# Patient Record
Sex: FEMALE | Race: WHITE
[De-identification: names, ages, dates, MRNs, and addresses within clinical notes are randomized per-mention and may not be internally consistent; named-entity substitution may affect disease eponyms.]

---

## 2020-03-07 ENCOUNTER — HOSPITAL ENCOUNTER (EMERGENCY)
Dept: HOSPITAL 76 - ED | Age: 30
Discharge: HOME | End: 2020-03-07
Payer: MEDICAID

## 2020-03-07 VITALS — DIASTOLIC BLOOD PRESSURE: 74 MMHG | SYSTOLIC BLOOD PRESSURE: 123 MMHG

## 2020-03-07 DIAGNOSIS — F41.9: ICD-10-CM

## 2020-03-07 DIAGNOSIS — Z76.0: Primary | ICD-10-CM

## 2020-03-07 PROCEDURE — 99284 EMERGENCY DEPT VISIT MOD MDM: CPT

## 2020-03-07 PROCEDURE — 99281 EMR DPT VST MAYX REQ PHY/QHP: CPT

## 2020-03-07 NOTE — ED PHYSICIAN DOCUMENTATION
History of Present Illness





- Stated complaint


Stated Complaint: MED REFILL





- Chief complaint


Chief Complaint: General





- History obtained from


History obtained from: Patient (29-year-old woman recently moved to the area and

is here requesting refills of her psychiatric medications.  She has been out of 

most of them for about a week and is not feeling psychotic or suicidal.  She is 

just starting to feel more anxious and would like some help.  She is tried to 

get a local prescriber but most of the offices are scheduling about a month 

out.)





Review of Systems


Ten Systems: 10 systems reviewed and negative





PD PAST MEDICAL HISTORY





- Present Medications


Home Medications: 


                                Ambulatory Orders











 Medication  Instructions  Recorded  Confirmed


 


ARIPiprazole [Aripiprazole] 10 mg PO DAILY 03/07/20 03/07/20


 


ARIPiprazole [Aripiprazole] 10 mg PO DAILY #30 tablet 03/07/20 


 


Gabapentin 400 mg PO TID 03/07/20 03/07/20


 


Venlafaxine ER [Effexor ER] 75 mg PO DAILY #30 capsule 03/07/20 


 


Venlafaxine HCl [Venlafaxine HCl 150 mg PO DAILY 03/07/20 03/07/20





ER]   


 


Venlafaxine HCl [Venlafaxine HCl 150 mg PO DAILY #30 tab.er.24 03/07/20 





ER]   


 


buPROPion HCL [Bupropion HCl] 150 mg PO BID 03/07/20 03/07/20


 


buPROPion [Wellbutrin Sr] 150 mg PO BID #60 tablet 03/07/20 


 


clonazePAM [Clonazepam] 1 mg PO BID 03/07/20 03/07/20


 


clonazePAM [Clonazepam] 1 mg PO BID PRN #15 tablet 03/07/20 














- Allergies


Allergies/Adverse Reactions: 


                                    Allergies











Allergy/AdvReac Type Severity Reaction Status Date / Time


 


No Known Drug Allergies Allergy   Verified 03/07/20 17:10














PD ED PE NORMAL





- Vitals


Vital signs reviewed: Yes





- General


General: Alert and oriented X 3, No acute distress





- HEENT


HEENT: PERRL





- Neuro


Neuro: Alert and oriented X 3, No motor deficit, No sensory deficit, Normal 

speech





- Psych


Psych: Normal mood, Normal affect





Results





- Vitals


Vitals: 





                               Vital Signs - 24 hr











  03/07/20





  17:06


 


Temperature 36.6 C


 


Heart Rate 98


 


Respiratory 16





Rate 


 


Blood Pressure 123/74


 


O2 Saturation 100








                                     Oxygen











O2 Source                      Room air

















PD MEDICAL DECISION MAKING





- ED course


ED course: 





Her meds were refilled, but only a few of the clonazepam with a taper, also she 

did not ask me specifically for gabapentin although it was noted on her med list

 from the nurses chart but per policy we do not refill gabapentin from this ER.





Departure





- Departure


Disposition: 01 Home, Self Care


Clinical Impression: 


 Psychiatric symptoms





Condition: Good


Record reviewed to determine appropriate education?: Yes


Instructions:  Anxiety Disorder


Prescriptions: 


ARIPiprazole [Aripiprazole] 10 mg PO DAILY #30 tablet


buPROPion [Wellbutrin Sr] 150 mg PO BID #60 tablet


clonazePAM [Clonazepam] 1 mg PO BID PRN #15 tablet


 PRN Reason: Anxiety


Venlafaxine ER [Effexor ER] 75 mg PO DAILY #30 capsule


Venlafaxine HCl [Venlafaxine HCl ER] 150 mg PO DAILY #30 tab.er.24


Comments: 


Return anytime for new or worsening symptoms.  It is important to follow-up with

a psychiatric prescriber locally.





Follow-up with [facility] at [phone] to schedule psychiatric 

care and counseling.

## 2020-05-30 ENCOUNTER — HOSPITAL ENCOUNTER (EMERGENCY)
Dept: HOSPITAL 76 - ED | Age: 30
Discharge: HOME | End: 2020-05-30
Payer: MEDICAID

## 2020-05-30 VITALS — DIASTOLIC BLOOD PRESSURE: 65 MMHG | SYSTOLIC BLOOD PRESSURE: 110 MMHG

## 2020-05-30 DIAGNOSIS — F17.200: ICD-10-CM

## 2020-05-30 DIAGNOSIS — R09.1: Primary | ICD-10-CM

## 2020-05-30 PROCEDURE — 99283 EMERGENCY DEPT VISIT LOW MDM: CPT

## 2020-05-30 PROCEDURE — 71101 X-RAY EXAM UNILAT RIBS/CHEST: CPT

## 2020-05-30 PROCEDURE — 99284 EMERGENCY DEPT VISIT MOD MDM: CPT

## 2020-05-30 NOTE — ED PHYSICIAN DOCUMENTATION
History of Present Illness





- Stated complaint


Stated Complaint: RIB PAIN





- Chief complaint


Chief Complaint: General





- History obtained from


History obtained from: Patient





- History of Present Illness


Timing: How many weeks ago (2)


Pain level max: 6


Pain level now: 5





- Additonal information


Additional information: 


L rib pain x 2 weeks. Had a cough, better now.  No fever. Worse with movement 

and better with rest.  She is concerned that she may have a broken rib. No 

trauma. No recent surgery.  No recent travel.  No leg or calf swelling.





Review of Systems


Constitutional: denies: Fever, Chills


Nose: denies: Rhinorrhea / runny nose, Congestion


Cardiac: denies: Chest pain / pressure


Respiratory: reports: Cough


GI: denies: Vomiting, Diarrhea


Skin: denies: Rash


Musculoskeletal: denies: Neck pain, Back pain


Neurologic: denies: Headache, Head injury





PD PAST MEDICAL HISTORY





- Past Medical History


Past Medical History: Yes


Psych: Depression, Anxiety





- Past Surgical History


Past Surgical History: No





- Present Medications


Home Medications: 


                                Ambulatory Orders











 Medication  Instructions  Recorded  Confirmed


 


ARIPiprazole [Aripiprazole] 10 mg PO DAILY 03/07/20 03/07/20


 


ARIPiprazole [Aripiprazole] 10 mg PO DAILY #30 tablet 03/07/20 


 


Gabapentin 400 mg PO TID 03/07/20 03/07/20


 


Venlafaxine ER [Effexor ER] 75 mg PO DAILY #30 capsule 03/07/20 


 


Venlafaxine HCl [Venlafaxine HCl 150 mg PO DAILY 03/07/20 03/07/20





ER]   


 


Venlafaxine HCl [Venlafaxine HCl 150 mg PO DAILY #30 tab.er.24 03/07/20 





ER]   


 


buPROPion HCL [Bupropion HCl] 150 mg PO BID 03/07/20 03/07/20


 


buPROPion [Wellbutrin Sr] 150 mg PO BID #60 tablet 03/07/20 


 


clonazePAM [Clonazepam] 1 mg PO BID 03/07/20 03/07/20


 


clonazePAM [Clonazepam] 1 mg PO BID PRN #15 tablet 03/07/20 


 


Meloxicam [Mobic] 15 mg PO DAILY PRN #20 tablet 05/30/20 














- Allergies


Allergies/Adverse Reactions: 


                                    Allergies











Allergy/AdvReac Type Severity Reaction Status Date / Time


 


No Known Drug Allergies Allergy   Verified 05/30/20 16:00














- Living Situation


Living Situation: reports: With family


Living Arrangement: reports: At home





- Social History


Does the pt smoke?: Yes


Smoking Status: Current every day smoker





- Family History


Family history: reports: Non contributory





PD ED PE NORMAL





- Vitals


Vital signs reviewed: Yes





- General


General: Alert and oriented X 3, No acute distress, Well developed/nourished





- HEENT


HEENT: PERRL, Moist mucous membranes





- Neck


Neck: Supple, no meningeal sign





- Cardiac


Cardiac: RRR, Strong equal pulses





- Respiratory


Respiratory: No respiratory distress, Other (Diminished breath sounds 

bilaterally.  No wheezing.  No chest wall tenderness on either side.  She points

 to her side on the left ribs, there is no tenderness here)





- Abdomen


Abdomen: Normal bowel sounds, Soft, Non tender, Non distended





- Derm


Derm: Warm and dry





- Extremities


Extremities: No edema, No calf tenderness / cord





- Neuro


Neuro: Alert and oriented X 3





- Psych


Psych: Normal mood, Normal affect





Results





- Vitals


Vitals: 


                               Vital Signs - 24 hr











  05/30/20 05/30/20





  15:58 16:39


 


Temperature 36 C L 


 


Heart Rate 88 88


 


Respiratory 18 14





Rate  


 


Blood Pressure 111/64 


 


O2 Saturation 100 








                                     Oxygen











O2 Source                      Room air

















- Rads (name of study)


  ** Left ribs with x-ray


Radiology: Prelim report reviewed, EMP read contemporaneously, See rad report 

(Normal)





PD MEDICAL DECISION MAKING





- ED course


Complexity details: reviewed results, re-evaluated patient, considered 

differential, d/w patient


ED course: 





Patient with what appears to be pleurisy.  No evidence of pulmonary embolus.  No

 evidence of pneumothorax.  No rib fracture.  Feels better after meloxicam and 

Lidoderm patch.  Albuterol inhaler did not change her symptoms much.  No 

pneumonia.  Patient is well-appearing, nontoxic.  Afebrile.   Patient counseled 

regarding signs and symptoms for which I believe and urgent re-evaluation would 

be necessary. Patient with good understanding of and agreement to plan and is 

comfortable going home at this time





This document was made in part using voice recognition software. While efforts 

are made to proofread this document, sound alike and grammatical errors may 

occur.





Departure





- Departure


Disposition: 01 Home, Self Care


Clinical Impression: 


 Pleurisy





Condition: Good


Instructions:  ED Chest Pain Pleurisy


Follow-Up: 


Holiday,Kailey M, PA [Primary Care Provider] - Within 1 week


Prescriptions: 


Meloxicam [Mobic] 15 mg PO DAILY PRN #20 tablet


 PRN Reason: pain


Comments: 


Return if you worsen. your xray is normal today. This should improve over the 

next week or two.

## 2020-05-30 NOTE — XRAY REPORT
Reason:  L rib pain x 2 weeks

Procedure Date:  05/30/2020   

Accession Number:  [id number] / [id number]                    

Procedure:  XR  - Ribs w/PA Chest LT CPT Code:  

 

***Final Report***

 

 

FULL RESULT:

 

 

EXAM:

LEFT RIB RADIOGRAPHY

 

EXAM DATE: 5/30/2020 04:53 PM.

 

CLINICAL HISTORY: Left rib pain x 2 weeks.

 

COMPARISON: None.

 

TECHNIQUE: 1 view of the chest and 2 views of the ribs.

 

FINDINGS:

Bones: Normal. No fracture or bone lesion.

 

Lungs: No focal opacities. No pneumothorax. No pleural effusions.

 

Mediastinum: Heart and mediastinal contours are unremarkable.

 

Other: S-shaped scoliosis.

IMPRESSION: Scoliosis, otherwise unremarkable chest and rib radiography.

 

[facility]

## 2020-11-29 ENCOUNTER — HOSPITAL ENCOUNTER (EMERGENCY)
Dept: HOSPITAL 76 - ED | Age: 30
Discharge: HOME | End: 2020-11-29
Payer: MEDICAID

## 2020-11-29 VITALS — DIASTOLIC BLOOD PRESSURE: 70 MMHG | SYSTOLIC BLOOD PRESSURE: 124 MMHG

## 2020-11-29 DIAGNOSIS — F17.200: ICD-10-CM

## 2020-11-29 DIAGNOSIS — R09.1: Primary | ICD-10-CM

## 2020-11-29 PROCEDURE — 99283 EMERGENCY DEPT VISIT LOW MDM: CPT

## 2020-11-29 PROCEDURE — 71045 X-RAY EXAM CHEST 1 VIEW: CPT

## 2020-11-29 NOTE — XRAY REPORT
PROCEDURE:  Chest 1 View X-Ray

 

INDICATIONS:  cough

 

TECHNIQUE:  One view of the chest was acquired.  

 

COMPARISON:  CXR 5/30/2020.

 

FINDINGS:  

 

Surgical changes and devices:  None.  

 

Lungs and pleura:  No pleural effusions or pneumothorax.  Lungs are clear.  

 

Mediastinum:  Mediastinal contours appear normal.  Heart size is normal.  

 

Bones and chest wall:  No suspicious bony lesions. Mild scoliosis. Overlying soft tissues appear unre
markable.  

 

IMPRESSION:  

No acute cardiopulmonary abnormality.

 

Reviewed by: Sherif Banks MD on 11/29/2020 9:55 PM PST

Approved by: Sherif Banks MD on 11/29/2020 9:55 PM PST

 

 

Station ID:  [id number]

## 2020-11-29 NOTE — ED PHYSICIAN DOCUMENTATION
History of Present Illness





- Stated complaint


Stated Complaint: COUGH, CHEST "POP"





- Chief complaint


Chief Complaint: General





- History obtained from


History obtained from: Patient





- History of Present Illness


Timing: Enter  time (18:00), Today


Pain level now: 4


Improved by: rest


Worsened by: movement, deep inspiration





- Additonal information


Additional information: 





c/o sudden onset left-sided chest pain 6 pm tonight when coughing. She says she 

has had previous episodes of similar pain and was diagnosed with pleurisy. 

denies fever, denies feeling short of breath





Review of Systems


Constitutional: denies: Fever


Cardiac: reports: Chest pain / pressure.  denies: Palpitations, Pedal edema, 

Calf pain


Respiratory: reports: Cough.  denies: Dyspnea, Hemoptysis, Wheezing





PD PAST MEDICAL HISTORY





- Past Medical History


Past Medical History: Yes


Respiratory: Other


Psych: Depression, Anxiety


Other Past Medical History: pleurisy





- Past Surgical History


Past Surgical History: No


/GYN:  section


HEENT: Tonsil/Adenoidectomy





- Present Medications


Home Medications: 


                                Ambulatory Orders











 Medication  Instructions  Recorded  Confirmed


 


Venlafaxine HCl [Venlafaxine HCl 150 mg PO DAILY #30 tab.er.24 20 





ER]   


 


clonazePAM [Clonazepam] 1 mg PO BID PRN #15 tablet 20 


 


Lisdexamfetamine Dimesylate 70 mg PO DAILY 20





[Vyvanse]   


 


Propranolol [Inderal] 10 mg DAILY 20


 


buPROPion [Wellbutrin Sr] 150 mg PO DAILY 20 














- Allergies


Allergies/Adverse Reactions: 


                                    Allergies











Allergy/AdvReac Type Severity Reaction Status Date / Time


 


No Known Drug Allergies Allergy   Verified 20 20:38














- Social History


Does the pt smoke?: Yes


Smoking Status: Current every day smoker


Does the pt drink ETOH?: No


Does the pt have substance abuse?: No


Substance Use and Type: Marijuana





- Immunizations


Immunizations are current?: Yes





- POLST


Patient has POLST: No





PD ED PE NORMAL





- Vitals


Vital signs reviewed: Yes





- General


General: Alert and oriented X 3, No acute distress, Well developed/nourished





- Cardiac


Cardiac: RRR, No murmur, No gallop, No rub





- Respiratory


Respiratory: No respiratory distress, Clear bilaterally





Results





- Vitals


Vitals: 


                               Vital Signs - 24 hr











  20





  20:38 21:03 22:44


 


Temperature 36.8 C  36.8 C


 


Heart Rate 108 H 106 H 90


 


Respiratory 16 18 16





Rate   


 


Blood Pressure 140/90 H 114/76 124/70


 


O2 Saturation 100 99 100








                                     Oxygen











O2 Source                      Room air

















- Rads (name of study)


  ** chest xray


Radiology: Prelim report reviewed, See rad report





PD MEDICAL DECISION MAKING





- ED course


Complexity details: reviewed results, re-evaluated patient, considered 

differential, d/w patient





Departure





- Departure


Disposition: 01 Home, Self Care


Clinical Impression: 


 Pleurisy





Condition: Good


Instructions:  ED Chest Pain Pleurisy


Follow-Up: 


Kailey Del Valle PA [Primary Care Provider] - 


Forms:  Activity restrictions


Discharge Date/Time: 20 22:44

## 2021-02-14 ENCOUNTER — HOSPITAL ENCOUNTER (OUTPATIENT)
Dept: HOSPITAL 76 - DI | Age: 31
Discharge: HOME | End: 2021-02-14
Attending: OBSTETRICS & GYNECOLOGY
Payer: MEDICAID

## 2021-02-14 DIAGNOSIS — N92.6: Primary | ICD-10-CM

## 2021-02-14 NOTE — ULTRASOUND REPORT
PROCEDURE:  Pelvic w/Transvaginal

 

INDICATIONS:  IRREGULAR MENSES

 

TECHNIQUE:  

Real-time scanning was performed of the pelvic organs, with image documentation.  Additional endovagi
nal scanning was necessary due to incomplete visualization of the adnexal and endometrial structures 
by transabdominal scanning.  

 

COMPARISON:  None.

 

FINDINGS:  

No pathologic free abdominal or pelvic fluid.  

 

Uterus:  Uterus is normal in size at 9.6 x 4.4 x 5.2 cm. The uterus demonstrates a heterogeneous lobu
lated appearance, yet without focal fibroids.  The endometrium measures 10 mm in combined thickness. 
 Nabothian cysts are incidentally noted. A  scar can be seen.

 

Ovaries:  The right ovary measures 3 x 2.5 x 2 cm and the left ovary measures 3 x 2.3 x 2.8 cm. No si
gnificant ovarian abnormalities are seen.  There are more than 12 follicles seen involving each ovary
. No adnexal masses are seen.   

 

 

 

IMPRESSION:  

 

The uterine size is at the upper limits of normal, with a lobulated, heterogeneous appearance. Howeve
r, no focal fibroids can be seen.

 

More than 12 follicles can be seen involving each ovary. Please consider polycystic ovarian syndrome.


 

Reviewed by: Yossi Coleman MD on 2021 2:52 PM [facility]

Approved by: Yossi Coleman MD on 2021 2:52 PM [facility]

 

 

Station ID:  [id number]

## 2021-03-16 ENCOUNTER — HOSPITAL ENCOUNTER (OUTPATIENT)
Dept: HOSPITAL 76 - LAB.WCP | Age: 31
Discharge: HOME | End: 2021-03-16
Attending: PSYCHIATRY & NEUROLOGY
Payer: MEDICAID

## 2021-03-16 DIAGNOSIS — Z79.899: Primary | ICD-10-CM

## 2021-03-16 PROCEDURE — 81599 UNLISTED MAAA: CPT

## 2021-03-16 PROCEDURE — 80365 DRUG SCREENING OXYCODONE: CPT

## 2021-03-16 PROCEDURE — 80361 OPIATES 1 OR MORE: CPT

## 2021-04-17 ENCOUNTER — HOSPITAL ENCOUNTER (EMERGENCY)
Dept: HOSPITAL 76 - ED | Age: 31
Discharge: HOME | End: 2021-04-17
Payer: MEDICAID

## 2021-04-17 VITALS — DIASTOLIC BLOOD PRESSURE: 87 MMHG | SYSTOLIC BLOOD PRESSURE: 136 MMHG
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
## 2021-04-17 DIAGNOSIS — F17.200: ICD-10-CM

## 2021-04-17 DIAGNOSIS — M79.645: ICD-10-CM

## 2021-04-17 DIAGNOSIS — S60.222A: ICD-10-CM

## 2021-04-17 DIAGNOSIS — M25.532: Primary | ICD-10-CM
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
## 2021-04-17 PROCEDURE — 99283 EMERGENCY DEPT VISIT LOW MDM: CPT

## 2021-04-17 PROCEDURE — 99282 EMERGENCY DEPT VISIT SF MDM: CPT

## 2021-04-17 NOTE — ED PHYSICIAN DOCUMENTATION
History of Present Illness





- Stated complaint


Stated Complaint: LEFT WRIST INJURY





- Chief complaint


Chief Complaint: Trauma Ext





- Additonal information


Additional information: 


30-year-old female presents the emergency department for evaluation of acute 

left wrist pain sustained this afternoon when she tripped over a dog gate she 

fell directly onto her wrist and knee.  No history of previous injury to this 

hand or wrist.  She is right-hand dominant.








Review of Systems


Constitutional: denies: Fever, Chills


Eyes: reports: Reviewed and negative


Ears: reports: Reviewed and negative


Nose: reports: Reviewed and negative


Throat: reports: Reviewed and negative


Cardiac: reports: Chest pain / pressure


Respiratory: reports: Reviewed and negative


GI: reports: Reviewed and negative


: reports: Reviewed and negative


Musculoskeletal: reports: Extremity pain (Left hand wrist)





PD PAST MEDICAL HISTORY





- Past Medical History


Respiratory: Other


Psych: Depression, Anxiety





- Past Surgical History


Past Surgical History: No


/GYN:  section


HEENT: Tonsil/Adenoidectomy





- Present Medications


Home Medications: 


                                Ambulatory Orders











 Medication  Instructions  Recorded  Confirmed


 


Venlafaxine HCl [Venlafaxine HCl 150 mg PO DAILY #30 tab.er.24 20





ER]   


 


clonazePAM [Clonazepam] 1 mg PO BID PRN #15 tablet 20


 


Propranolol [Inderal] 10 mg PO BID 20


 


Brexpiprazole [Rexulti] 1 mg PO DAILY 21


 


Dextroamphetamine/Amphetamine 40 mg PO DAILY 21





[Adderall 20 mg Tablet]   


 


Venlafaxine ER [Effexor ER] 75 mg PO DAILY 21














- Allergies


Allergies/Adverse Reactions: 


                                    Allergies











Allergy/AdvReac Type Severity Reaction Status Date / Time


 


No Known Drug Allergies Allergy   Verified 21 18:16














- Social History


Does the pt smoke?: Yes


Smoking Status: Current every day smoker


Does the pt drink ETOH?: No


Does the pt have substance abuse?: No





- Immunizations


Immunizations are current?: Yes





- POLST


Patient has POLST: No





PD ED PE EXPANDED





- General


General: Alert, In Pain





- Extremities


Extremities: Left wrist (Positive snuffbox tenderness left wrist.  Pain with 

passive flexion and extension of wrist.  Most of the pain is on the dorsum.  No 

deformity.  2+ radial and DP pulse.), Left hand (Significant swelling with mild 

ecchymosis left thenar eminence.Flexion and extension at MCP and DIP of thumb 

preserved.)





Results





- Vitals


Vitals: 


                               Vital Signs - 24 hr











  21





  18:19


 


Temperature 36.8 C


 


Heart Rate 100


 


Respiratory 19





Rate 


 


Blood Pressure 136/87 H


 


O2 Saturation 100








                                     Oxygen











O2 Source                      Room air

















- Rads (name of study)


  ** left wrist XR


Radiology: Final report received (No acute osseous abnormality.)





  ** left hand XR


Radiology: Final report received (No acute osseous abnormality.)





PD MEDICAL DECISION MAKING





- ED course


Complexity details: reviewed results, re-evaluated patient, d/w patient


ED course: 





30-year-old female presents here with acute left wrist pain after a fall at 

home.  She has fair amount of swelling on the thenar eminence of the left hand 

as well as scaphoid tenderness.  X-ray shows no acute abnormality.  She was 

placed in a thumb spica splint will be advised ibuprofen and follow-up with 

orthopedics in 7 to 10 days for repeat imaging.





Departure





- Departure


Disposition: 01 Home, Self Care


Clinical Impression: 


 Left wrist pain, Pain of left thumb





Condition: Stable


Record reviewed to determine appropriate education?: Yes


Instructions:  ED Contusion Upper Extr Ch


Follow-Up: 


[facility] [Provider Group]


Comments: 


Marcia you are seen in the emergency department today for pain in your left thumb

and wrist after fall at home.  The x-rays of both your hand and wrist do not 

show any obvious fractures.  However you do have tenderness in the snuffbox area

of your left wrist which can be a sign of an occult or difficult to see fracture

of the scaphoid bone.





Please continue to wear your wrist splint.  I recommend you take ibuprofen for 

discomfort over-the-counter.  Please call the orthopedics department to schedule

follow-up in 7 to 10 days.  If pain is improved and x-ray shows no signs of a 

fracture you may then likely be free of the splint.

## 2021-04-17 NOTE — XRAY REPORT
PROCEDURE:  Hand 3 View LT

 

INDICATIONS:  fall; pain in wrist and thumb

 

TECHNIQUE:  3 views of the hand(s) acquired.  

 

COMPARISON:  Same-day wrist radiographs.

 

FINDINGS:  

 

Bones: There is no fracture. Mild negative ulnar variance which may predispose to Keinboch's disease.
 Joint spacing is maintained.

 

Soft tissues:  No suspicious soft tissue calcifications.  

 

IMPRESSION:  

No acute osseous abnormality.

 

Reviewed by: Quang Whitehead DO on 4/17/2021 5:51 PM [facility]

Approved by: Quang Whitehead DO on 4/17/2021 5:51 PM [facility]

 

 

Station ID:  [id number]

## 2021-04-17 NOTE — XRAY REPORT
PROCEDURE:  Wrist 4 View LT

 

INDICATIONS: Trauma

 

TECHNIQUE:  4 views of the wrist were acquired.  

 

COMPARISON:  None

 

FINDINGS:  

 

Bones: No fracture or dislocation. Mild negative ulnar variance which may predispose to an box diseas
e. Alignment is otherwise normal. Joint spacing is maintained.

 

Scaphoid view:  No fracture.

 

Soft tissues:  No suspicious soft tissue calcifications.  

 

IMPRESSION:  

No acute osseous abnormality.

 

Reviewed by: Quang Whitehead DO on 4/17/2021 5:49 PM [facility]

Approved by: Quang Whitehead DO on 4/17/2021 5:49 PM [facility]

 

 

Station ID:  [id number]

## 2021-05-08 ENCOUNTER — HOSPITAL ENCOUNTER (EMERGENCY)
Dept: HOSPITAL 76 - ED | Age: 31
Discharge: HOME | End: 2021-05-08
Payer: MEDICAID

## 2021-05-08 VITALS — DIASTOLIC BLOOD PRESSURE: 75 MMHG | SYSTOLIC BLOOD PRESSURE: 125 MMHG

## 2021-05-08 DIAGNOSIS — Z72.0: ICD-10-CM

## 2021-05-08 DIAGNOSIS — R07.81: Primary | ICD-10-CM

## 2021-05-08 LAB
ANION GAP SERPL CALCULATED.4IONS-SCNC: 10 MMOL/L
BASOPHILS NFR BLD AUTO: 0.1 10^3/UL
BASOPHILS NFR BLD AUTO: 0.5 %
BUN SERPL-MCNC: 8 MG/DL
CALCIUM UR-MCNC: 9.8 MG/DL
CHLORIDE SERPL-SCNC: 101 MMOL/L
CLARITY UR REFRACT.AUTO: CLEAR
CO2 SERPL-SCNC: 27 MMOL/L
CREAT SERPLBLD-SCNC: 0.7 MG/DL
EOSINOPHIL # BLD AUTO: 0.1 10^3/UL
EOSINOPHIL NFR BLD AUTO: 1.2 %
ERYTHROCYTE [DISTWIDTH] IN BLOOD BY AUTOMATED COUNT: 14.5 %
GFRSERPLBLD MDRD-ARVRAT: 97 ML/MIN/{1.73_M2}
GLUCOSE SERPL-MCNC: 82 MG/DL
GLUCOSE UR QL STRIP.AUTO: NEGATIVE MG/DL
HCG UR QL: NEGATIVE
HCT VFR BLD AUTO: 43.1 %
HGB UR QL STRIP: 13.6 G/DL
KETONES UR QL STRIP.AUTO: NEGATIVE MG/DL
LYMPHOCYTES # SPEC AUTO: 2.3 10^3/UL
LYMPHOCYTES NFR BLD AUTO: 20.2 %
MCH RBC QN AUTO: 28.4 PG
MCHC RBC AUTO-ENTMCNC: 31.6 G/DL
MCV RBC AUTO: 90 FL
MONOCYTES # BLD AUTO: 1.1 10^3/UL
MONOCYTES NFR BLD AUTO: 9.1 %
NEUTROPHILS # BLD AUTO: 8 10^3/UL
NEUTROPHILS # SNV AUTO: 11.6 X10^3/UL
NEUTROPHILS NFR BLD AUTO: 68.7 %
NITRITE UR QL STRIP.AUTO: NEGATIVE
NRBC # BLD AUTO: 0 /100WBC
NRBC # BLD AUTO: 0 X10^3/UL
PDW BLD AUTO: 9.9 FL
PH UR STRIP.AUTO: 6 PH
PLATELET # BLD: 428 10^3/UL
POTASSIUM SERPL-SCNC: 3.2 MMOL/L
PROT UR STRIP.AUTO-MCNC: NEGATIVE MG/DL
RBC # UR STRIP.AUTO: (no result) /UL
RBC MAR: 4.79 10^6/UL
SODIUM SERPLBLD-SCNC: 138 MMOL/L
SP GR UR STRIP.AUTO: 1.02
UROBILINOGEN UR QL STRIP.AUTO: (no result) E.U./DL
UROBILINOGEN UR STRIP.AUTO-MCNC: NEGATIVE MG/DL

## 2021-05-08 PROCEDURE — 71101 X-RAY EXAM UNILAT RIBS/CHEST: CPT

## 2021-05-08 PROCEDURE — 81003 URINALYSIS AUTO W/O SCOPE: CPT

## 2021-05-08 PROCEDURE — 85025 COMPLETE CBC W/AUTO DIFF WBC: CPT

## 2021-05-08 PROCEDURE — 80048 BASIC METABOLIC PNL TOTAL CA: CPT

## 2021-05-08 PROCEDURE — 96374 THER/PROPH/DIAG INJ IV PUSH: CPT

## 2021-05-08 PROCEDURE — 81001 URINALYSIS AUTO W/SCOPE: CPT

## 2021-05-08 PROCEDURE — 87086 URINE CULTURE/COLONY COUNT: CPT

## 2021-05-08 PROCEDURE — 36415 COLL VENOUS BLD VENIPUNCTURE: CPT

## 2021-05-08 PROCEDURE — 99284 EMERGENCY DEPT VISIT MOD MDM: CPT

## 2021-05-08 PROCEDURE — 96375 TX/PRO/DX INJ NEW DRUG ADDON: CPT

## 2021-05-08 PROCEDURE — 81025 URINE PREGNANCY TEST: CPT

## 2021-05-08 PROCEDURE — 74177 CT ABD & PELVIS W/CONTRAST: CPT

## 2021-05-08 NOTE — XRAY REPORT
PROCEDURE:  Ribs w/PA Chest RT

 

INDICATIONS:  rib wall pain

 

TECHNIQUE:  3 views of the right ribs were acquired, along with a single view chest.  

 

COMPARISON:  Chest x-ray 11/29/2020

 

FINDINGS:  

 

Surgical changes and devices:  None.  

 

Bones and chest wall:  No displaced rib fracture identified. No suspicious bony lesions.  Overlying s
oft tissues appear unremarkable.  

 

Lungs and pleura:  No pleural effusions or pneumothorax.  Lungs appear clear.  

 

Mediastinum:  Mediastinal contours appear normal.  Heart size is normal.  

 

IMPRESSION:  

 

1. No displaced rib fracture identified.

 

Reviewed by: Damian Oliver MD on 5/8/2021 9:10 PM PDT

Approved by: Damian Oliver MD on 5/8/2021 9:10 PM PDT

 

 

Station ID:  [id number]

## 2021-05-08 NOTE — ED PHYSICIAN DOCUMENTATION
History of Present Illness





- Stated complaint


Stated Complaint: RT SIDE RIB PX





- Chief complaint


Chief Complaint: General





- Additonal information


Additional information: 


30-year-old female presents emergency department for now 3 days of right lateral

rib wall pain, pain in the posterior back as well as the right flank.  She 

states that she feels like a rib is out of place.  When she coughs she feels a 

popping in her chest.  She denies any recent falls or trauma.  She was seen at 

[facility] yesterday for similar had a chest x-ray completed that she was 

told was unremarkable and diagnosed with pleurisy.  Since then every time she c

oughs she has severe pain that is unrelenting.  She was unable to work today due

to the pain.  She has taken Excedrin without relief of the symptoms.  She is 

also attempted smoking cannabis. Patient is a daily tobacco user but denies any 

ongoing coughs fevers hemoptysis or sputum production.








Review of Systems


Constitutional: denies: Fever, Chills


Eyes: reports: Reviewed and negative


Ears: reports: Reviewed and negative


Nose: reports: Reviewed and negative


Throat: reports: Reviewed and negative


Cardiac: reports: Other (right rib wall pain)


Respiratory: reports: Cough.  denies: Dyspnea, Hemoptysis, Wheezing


GI: reports: Abdominal Pain.  denies: Nausea, Vomiting


: denies: Dysuria, Frequency, Hematuria


Skin: denies: Rash, Lesions





PD PAST MEDICAL HISTORY





- Past Medical History


Past Medical History: Yes


Respiratory: Other


Psych: Depression, Anxiety, ADD/ADHD





- Past Surgical History


Past Surgical History: Yes


/GYN:  section


HEENT: Tonsil/Adenoidectomy





- Present Medications


Home Medications: 


                                Ambulatory Orders











 Medication  Instructions  Recorded  Confirmed


 


Venlafaxine HCl [Venlafaxine HCl 150 mg PO DAILY #30 tab.er.24 20





ER]   


 


clonazePAM [Clonazepam] 1 mg PO BID PRN #15 tablet 20


 


Propranolol [Inderal] 10 mg PO BID 20


 


Brexpiprazole [Rexulti] 1 mg PO DAILY 21


 


Dextroamphetamine/Amphetamine 50 mg PO DAILY 21





[Adderall 20 mg Tablet]   


 


Venlafaxine ER [Effexor ER] 75 mg PO DAILY 21


 


HYDROcod/ACETAM 5/325 [Norco 5/325] 1 - 2 tablet PO Q6H PRN #10 tablet 21 


 


Ibuprofen [Motrin] 600 mg PO Q6H PRN #30 tab 21 


 


Methocarbamol [Robaxin-750] 750 mg PO TID PRN #30 tablet 21 














- Allergies


Allergies/Adverse Reactions: 


                                    Allergies











Allergy/AdvReac Type Severity Reaction Status Date / Time


 


No Known Drug Allergies Allergy   Verified 21 19:48














- Social History


Does the pt smoke?: Yes


Smoking Status: Current every day smoker


Does the pt drink ETOH?: No


Does the pt have substance abuse?: Yes


Substance Use and Type: Marijuana





- Immunizations


Immunizations are current?: Yes





- POLST


Patient has POLST: No





PD ED PE EXPANDED





- General


General: Alert, In Pain





- Cardiac


Cardiac: Regular Rate, Radial strong equal, Pedal strong equal, Cap refill < 2 

sec





- Respiratory


Respiratory: Clear to ausultation rosalva.  No: Distress, Labored





- Abdomen


Abdomen: Normal Bowel sounds, Tender to palpation, Other (Right flank/CVA 

tenderness)





- Back


Back: CVA TTP right, Other (right lateral chest wall tenderness).  No: Vertebral

tenderness, Soft tissue tenderness





- Extremities


Extremities: Normal.  No: Deformity, Tenderness





Results





- Vitals


Vitals: 


                               Vital Signs - 24 hr











  21





  19:48 20:04


 


Temperature 36.5 C 


 


Heart Rate 122 H 94


 


Respiratory 18 18





Rate  


 


Blood Pressure 133/90 H 140/100 H


 


O2 Saturation 100 100








                                     Oxygen











O2 Source                      Room air

















- Labs


Labs: 


                                Laboratory Tests











  21





  20:25 20:30 20:30


 


WBC   11.6 H 


 


RBC   4.79 


 


Hgb   13.6 


 


Hct   43.1 


 


MCV   90.0 


 


MCH   28.4 


 


MCHC   31.6 L 


 


RDW   14.5 


 


Plt Count   428 


 


MPV   9.9 


 


Neut # (Auto)   8.0 H 


 


Lymph # (Auto)   2.3 


 


Mono # (Auto)   1.1 H 


 


Eos # (Auto)   0.1 


 


Baso # (Auto)   0.1 


 


Absolute Nucleated RBC   0.00 


 


Nucleated RBC %   0.0 


 


Sodium    138


 


Potassium    3.2 L


 


Chloride    101


 


Carbon Dioxide    27


 


Anion Gap    10.0


 


BUN    8


 


Creatinine    0.7


 


Estimated GFR (MDRD)    97


 


Glucose    82


 


Calcium    9.8


 


Urine Color  YELLOW  


 


Urine Clarity  CLEAR  


 


Urine pH  6.0  


 


Ur Specific Gravity  1.025  


 


Urine Protein  NEGATIVE  


 


Urine Glucose (UA)  NEGATIVE  


 


Urine Ketones  NEGATIVE  


 


Urine Occult Blood  TRACE-INTA  


 


Urine Nitrite  NEGATIVE  


 


Urine Bilirubin  NEGATIVE  


 


Urine Urobilinogen  0.2 (NORMAL)  


 


Ur Leukocyte Esterase  NEGATIVE  


 


Ur Microscopic Review  NOT INDICATED  


 


Urine Culture Comments  NOT INDICATED  


 


Urine HCG, Qual  NEGATIVE  














- Rads (name of study)


  ** XR right ribs with PA


Radiology: Final report received (Lungs appear clear.  No displaced rib 

fractures identified.Effusions or pneumothorax.)





  ** CT abdomen w


Radiology: Final report received (Also nephrolithiasis or obstructive uropathy. 

Nondistention of the descending and proximal sigmoid colon with slight wall 

thickening.  The findings are suggestive of mild infectious or inflammatory 

colitis.  No evidence of appendicitis.)





PD MEDICAL DECISION MAKING





- ED course


Complexity details: reviewed results, re-evaluated patient, d/w patient


ED course: 


30-year-old female presents emergency department for evaluation of recurrent 

right rib wall pain.  She had similar presentation 1 year ago this episode began

about 3 days ago.  She feels a popping sensation and movement in her ribs when 

she takes a deep breath she has no cough fevers or hemoptysis.  Chest x-ray 

reveals no findings consistent with pneumonia or pneumothorax.  Rib series also 

revealed no obviously displaced fractures.  The pain is reproducible and did 

improve somewhat following Toradol and Dilaudid administration.  However she 

also reported right CVA tenderness as well as hematuria.  Her urine shows no 

findings of infection or gross hematuria.  A CT of the abdomen was completed And

showed no findings of nephrolithiasis or obstructive uropathy.  The CT scan did 

suggest a colitis however she has no left-sided abdominal pain complaints of 

dysuria fevers or diarrhea.





I suspect that the patient has musculoskeletal rib pain associated with her 

scoliosis.  I think she would likely benefit from physical therapy.  Patient 

will be referred back to her primary care doctor to get a physical therapy 

referral.  I will write a prescription for ibuprofen limited amount of muscle 

relaxer as well as hydrocodone.  Emergent worrisome return precautions were 

discussed.








Departure





- Departure


Disposition: 01 Home, Self Care


Clinical Impression: 


 Rib pain on right side





Condition: Stable


Record reviewed to determine appropriate education?: Yes


Instructions:  ED Chest Pain Costochondritis


Follow-Up: 


GALINA HDEZ PA-C [Primary Care Provider] - 


Prescriptions: 


Ibuprofen [Motrin] 600 mg PO Q6H PRN #30 tab


 PRN Reason: Pain


HYDROcod/ACETAM 5/325 [Norco 5/325] 1 - 2 tablet PO Q6H PRN #10 tablet


 PRN Reason: Pain


Methocarbamol [Robaxin-750] 750 mg PO TID PRN #30 tablet


 PRN Reason: Spasms


Comments: 


Marcia you are seen in the emergency department today for right lower rib wall 

pain.  As we discussed the chest x-ray rib series and CT did not show any 

worrisome findings either with your lungs, the ribs or your kidneys.  Your 

screening labs were essentially unremarkable.  You do not have infection in your

urine or blood in your urine.





I suspect that you are having muscle pain between the ribs secondary to your 

scoliosis.  In the long-term I think you would benefit from referral to physical

therapy.  Please discuss this with your primary care provider.





In order to help manage the pain I would like you to take the ibuprofen with 

food 2-3 times a day.  Do not take Excedrin with this medication.  Do not take 

other NSAID medications like Aleve or naproxen sodium.  I have also prescribed a

muscle relaxer that I think will help you take deeper breaths.  For severe pain 

I did prescribe a very limited amount of hydrocodone.  This cannot be refilled 

by the emergency department.  Please do not drive if taking this medication it 

legally intoxicated you.





Return to the emergency department for shortness of air, bloody sputum fevers.

## 2021-05-08 NOTE — CT REPORT
PROCEDURE:  Abdomen/Pelvis W

 

INDICATIONS:  Right flank, CVA pain

 

CONTRAST:  IV CONTRAST: Isovue 300 ml: 100 PO CONTRAST: *NO PO CONTRAST 

 

TECHNIQUE:  

After the administration of intravenous contrast, 5 mm thick sections acquired from the diaphragms to
 the symphysis.  5 mm thick coronal and sagittal reformats were acquired.  For radiation dose reducti
on, the following was used:  automated exposure control, adjustment of mA and/or kV according to delmis
ent size.  

 

COMPARISON:  Pelvic ultrasound 2/14/2021.

 

FINDINGS:  

Image quality:  Excellent.  

 

ABDOMEN:  

Lung bases: There is mild dependent atelectasis bilaterally. There is a small right lower lobe pulmon
toshia nodule measuring up to 0.3 cm on series 4 image 6. Heart size is normal.  

 

Solid organs:  Evaluation of the liver demonstrates no focal hepatic lesions. Gallbladder appears wit
hin normal limits without calcified gallstones. Biliary system is non dilated.  The spleen is normal 
in size. Pancreas enhances normally without peripancreatic fat stranding or fluid collections.  No ad
renal nodules.  Kidneys demonstrate no hydronephrosis. No perinephric stranding or fluid collections.


 

Peritoneum and bowel: Small bowel loops demonstrate normal wall thickness and caliber. The appendix i
s normal in appearance. The descending and proximal sigmoid colon are nondistended with mild wall thi
ckening. The findings are suggestive of a mild infectious or inflammatory colitis. No free fluid or a
ir.  

 

Nodes and vessels:  No retroperitoneal or mesenteric adenopathy by size criteria.  Aorta and inferior
 vena cava are normal in size.  

 

Miscellaneous:  No ventral hernias.  

 

 

PELVIS:  

Genitourinary:  Bladder wall thickness is normal. The uterus and ovaries appear within normal size li
mits for age.

 

Miscellaneous:  No inguinal hernias or adenopathy.  

 

Bones:  No suspicious bony lesions.  No vertebral body compression fractures.  

 

IMPRESSION:  

 

1. No evidence of nephrolithiasis or obstructive uropathy. No CT evidence of pyelonephritis.

 

2. Nondistention of the descending and proximal sigmoid colon with slight wall thickening. The findin
gs are suggestive of a mild infectious or inflammatory colitis.

 

3. No evidence of appendicitis.

 

Reviewed by: Damian Oliver MD on 5/8/2021 9:51 PM PDT

Approved by: Damian Oliver MD on 5/8/2021 9:51 PM PDT

 

 

Station ID:  [id number]

## 2021-11-05 ENCOUNTER — HOSPITAL ENCOUNTER (OUTPATIENT)
Dept: HOSPITAL 76 - LAB.N | Age: 31
Discharge: HOME | End: 2021-11-05
Attending: PSYCHIATRY & NEUROLOGY
Payer: MEDICAID

## 2021-11-05 DIAGNOSIS — Z79.899: Primary | ICD-10-CM

## 2021-11-05 LAB
AMPHET UR QL SCN: POSITIVE
BARBITURATES UR QL SCN>300 NG/ML: NEGATIVE
BENZODIAZ UR QL SCN: NEGATIVE
CHOLEST SERPL-MCNC: 286 MG/DL
COCAINE UR-SCNC: NEGATIVE UMOL/L
HDLC SERPL-MCNC: 73 MG/DL
HDLC SERPL: 3.9 {RATIO} (ref ?–4.4)
LDLC SERPL CALC-MCNC: 150 MG/DL
LDLC/HDLC SERPL: 2.1 {RATIO} (ref ?–4.4)
METHADONE UR QL SCN: NEGATIVE
METHAMPHET UR QL SCN: NEGATIVE
OPIATES UR QL SCN: NEGATIVE
THC UR QL SCN: NEGATIVE
TRIGL P FAST SERPL-MCNC: 313 MG/DL
VLDLC SERPL-SCNC: 63 MG/DL
VOLATILE DRUGS POS SERPL SCN: (no result)

## 2021-11-05 PROCEDURE — 83721 ASSAY OF BLOOD LIPOPROTEIN: CPT

## 2021-11-05 PROCEDURE — 80061 LIPID PANEL: CPT

## 2021-11-05 PROCEDURE — 36415 COLL VENOUS BLD VENIPUNCTURE: CPT

## 2021-11-05 PROCEDURE — 80306 DRUG TEST PRSMV INSTRMNT: CPT
